# Patient Record
Sex: MALE | Race: WHITE | NOT HISPANIC OR LATINO | Employment: UNEMPLOYED | URBAN - METROPOLITAN AREA
[De-identification: names, ages, dates, MRNs, and addresses within clinical notes are randomized per-mention and may not be internally consistent; named-entity substitution may affect disease eponyms.]

---

## 2020-10-01 ENCOUNTER — HOSPITAL ENCOUNTER (EMERGENCY)
Facility: HOSPITAL | Age: 25
Discharge: HOME | End: 2020-10-02
Attending: EMERGENCY MEDICINE | Admitting: EMERGENCY MEDICINE

## 2020-10-01 DIAGNOSIS — F19.10 SUBSTANCE ABUSE (HCC): Primary | ICD-10-CM

## 2020-10-01 PROCEDURE — 99285 EMERGENCY DEPT VISIT HI MDM: CPT

## 2020-10-02 VITALS
WEIGHT: 150 LBS | SYSTOLIC BLOOD PRESSURE: 116 MMHG | RESPIRATION RATE: 16 BRPM | BODY MASS INDEX: 22.73 KG/M2 | TEMPERATURE: 97.3 F | OXYGEN SATURATION: 96 % | HEIGHT: 68 IN | DIASTOLIC BLOOD PRESSURE: 70 MMHG | HEART RATE: 100 BPM

## 2020-10-02 NOTE — ED NOTES
"Arrived via [facility] s/p possible overdose. PT reports \"only smoking pot\". EMS reports PT cyanotic and had agonal respirations upon their arrival on scene. EMS established 16g IV R EJ and administered 2mg Narcan prior to arrival. PT is now awake and alert.    PT placed in mask upon arrival. This nurse wore appropriate PPE during all interactions with this patient.       Angela Lynne RN  10/01/20 2141       Angela Lynne RN  10/01/20 2145    "

## 2020-10-02 NOTE — ED NOTES
This RN in appropriate PPE for all patient care interactions. Pt masked and redirected for proper mask use when necessary. Hand hygiene performed before and after all patient care interactions.       Tez Fan, RN  10/02/20 0058

## 2020-10-02 NOTE — ED NOTES
Pt wearing mask. This RN wearing appropriate PPE, including mask and eye protection, during all pt care.       Mira Alvarenga, RN  10/02/20 0137

## 2020-10-02 NOTE — ED PROVIDER NOTES
" EMERGENCY DEPARTMENT ENCOUNTER    Room Number:  23/23  Date of encounter:  10/2/2020  PCP: Provider, No Known  Historian: Patient      HPI:  Chief Complaint: Drug overdose  A complete HPI/ROS/PMH/PSH/SH/FH are unobtainable due to: N/A    Context: Simone Roa is a 25 y.o. male who presents to the ED from his sister's home where he collapsed in the bathroom.  EMS reports that the patient was cyanotic with agonal respirations on their arrival.  He was given 2 mg of intravenous Narcan with prompt response.  The patient states he smoked marijuana and took \"half a Xanax\" around 730.  He denies any sort of suicidal intent.  He states \"my best guess is that someone laced that Xanax with fentanyl\".      The patient was placed in a mask in triage, hand hygiene was performed before and after my interaction with the patient.  I wore a mask, safety glasses and gloves during my entire interaction with the patient.    PAST MEDICAL HISTORY  Active Ambulatory Problems     Diagnosis Date Noted   • Polysubstance abuse (CMS/HCC) 10/15/2016   • Mood disorder (CMS/HCC) 10/15/2016   • Toxic encephalopathy 10/15/2016   • Suicide attempt (CMS/HCC) 10/15/2016     Resolved Ambulatory Problems     Diagnosis Date Noted   • No Resolved Ambulatory Problems     Past Medical History:   Diagnosis Date   • Anxiety    • Bipolar 1 disorder (CMS/HCC)    • Depression    • Facial contusion    • Seizures (CMS/HCC)    • Substance abuse (CMS/HCC)          PAST SURGICAL HISTORY  History reviewed. No pertinent surgical history.      FAMILY HISTORY  Family History   Problem Relation Age of Onset   • Drug abuse Father    • Drug abuse Sister          SOCIAL HISTORY  Social History     Socioeconomic History   • Marital status: Single     Spouse name: Not on file   • Number of children: Not on file   • Years of education: Not on file   • Highest education level: Not on file   Tobacco Use   • Smoking status: Current Every Day Smoker     Packs/day: 1.00   • " Smokeless tobacco: Never Used   Substance and Sexual Activity   • Alcohol use: No     Comment: pt states he normally never drinks alcohol   • Drug use: Yes     Types: Marijuana, Heroin, Benzodiazepines     Comment: heroin use tonight, Xanax use also   • Sexual activity: Defer         ALLERGIES  Lortab [hydrocodone-acetaminophen] and Percocet [oxycodone-acetaminophen]        REVIEW OF SYSTEMS  Review of Systems   Reason unable to perform ROS: He is not the most cooperative historian.   Psychiatric/Behavioral:        He adamantly denies SI.        All systems reviewed and negative except for those discussed in HPI.       PHYSICAL EXAM    I have reviewed the triage vital signs and nursing notes.    ED Triage Vitals   Temp Heart Rate Resp BP SpO2   10/01/20 2143 10/01/20 2142 10/01/20 2142 10/01/20 2142 10/01/20 2142   97.3 °F (36.3 °C) 101 16 154/98 97 %      Temp src Heart Rate Source Patient Position BP Location FiO2 (%)   10/01/20 2143 -- -- -- --   Tympanic           Physical Exam   Constitutional: Pt. is awake and alert. No distress.   HENT: Normocephalic and atraumatic,  EOM are normal. Pupils are equal, round, and reactive to light.   Cardiovascular: Normal rate, regular rhythm and normal heart sounds. Exam reveals no gallop and no friction rub.   No murmur heard.  Pulmonary/Chest: Effort normal and breath sounds normal. No stridor. No respiratory distress. No wheezes, no rales.   Musculoskeletal: Normal range of motion. No edema, tenderness or deformity.   Neurological: Pt. is alert and oriented to person place. Pt. has normal sensation and normal strength. No cranial nerve deficit. GCS score is 15.   Skin: Skin is warm and dry. No rash noted. Pt. is not diaphoretic. No erythema.   Psychiatric: He is irritable but denies SI.  Nursing note and vitals reviewed.        LAB RESULTS  No results found for this or any previous visit (from the past 24 hour(s)).    Ordered the above labs and independently reviewed the  results.        RADIOLOGY  No Radiology Exams Resulted Within Past 24 Hours    I ordered the above noted radiological studies. Reviewed by me and discussed with radiologist.  See dictation for official radiology interpretation.      PROCEDURES    Procedures      MEDICATIONS GIVEN IN ER    Medications - No data to display      PROGRESS, DATA ANALYSIS, CONSULTS, AND MEDICAL DECISION MAKING    Any/all labs have been independently reviewed by me.  Any/all radiology studies have been reviewed by me and discussed with radiologist dictating the report.   EKG's independently viewed and interpreted by me.  Discussion below represents my analysis of pertinent findings related to patient's condition, differential diagnosis, treatment plan and final disposition.      ED Course as of Oct 02 0128   Fri Oct 02, 2020   0127 Patient's vital signs remained stable throughout his time in the ER.  He adamantly denies SI.  His mother is here to take him home.    [WC]      ED Course User Index  [WC] Vikram Gallo MD       AS OF 01:28 EDT VITALS:    BP - 94/51  HR - 75  TEMP - 97.3 °F (36.3 °C) (Tympanic)  02 SATS - 98%        DIAGNOSIS  Final diagnoses:   Substance abuse (CMS/[facility])         DISPOSITION  Discharged           Vikram Gallo MD  10/02/20 0128